# Patient Record
Sex: MALE | Race: BLACK OR AFRICAN AMERICAN | NOT HISPANIC OR LATINO | Employment: FULL TIME | URBAN - METROPOLITAN AREA
[De-identification: names, ages, dates, MRNs, and addresses within clinical notes are randomized per-mention and may not be internally consistent; named-entity substitution may affect disease eponyms.]

---

## 2020-04-08 ENCOUNTER — HISTORICAL (OUTPATIENT)
Dept: ADMINISTRATIVE | Facility: HOSPITAL | Age: 25
End: 2020-04-08

## 2022-04-11 ENCOUNTER — HISTORICAL (OUTPATIENT)
Dept: ADMINISTRATIVE | Facility: HOSPITAL | Age: 27
End: 2022-04-11

## 2022-04-28 VITALS — BODY MASS INDEX: 30.45 KG/M2 | WEIGHT: 182.75 LBS | HEIGHT: 65 IN

## 2022-07-20 ENCOUNTER — HOSPITAL ENCOUNTER (EMERGENCY)
Facility: HOSPITAL | Age: 27
Discharge: HOME | End: 2022-07-21
Attending: FAMILY MEDICINE

## 2022-07-20 VITALS
SYSTOLIC BLOOD PRESSURE: 159 MMHG | DIASTOLIC BLOOD PRESSURE: 63 MMHG | TEMPERATURE: 97 F | HEIGHT: 66 IN | BODY MASS INDEX: 30.97 KG/M2 | OXYGEN SATURATION: 100 % | WEIGHT: 192.69 LBS | HEART RATE: 60 BPM | RESPIRATION RATE: 20 BRPM

## 2022-07-20 DIAGNOSIS — M25.561 ACUTE PAIN OF RIGHT KNEE: Primary | ICD-10-CM

## 2022-07-20 DIAGNOSIS — M25.569 KNEE PAIN: ICD-10-CM

## 2022-07-20 DIAGNOSIS — T14.90XA BLUNT FORCE INJURY: ICD-10-CM

## 2022-07-20 PROCEDURE — 99284 EMERGENCY DEPT VISIT MOD MDM: CPT | Mod: 25

## 2022-07-20 NOTE — Clinical Note
"Suzette Welch" Saul was seen and treated in our emergency department on 7/20/2022.  He may return to work on 07/23/2022.       If you have any questions or concerns, please don't hesitate to call.       RN    "

## 2022-07-21 RX ORDER — INDOMETHACIN 50 MG/1
50 CAPSULE ORAL
Qty: 15 CAPSULE | Refills: 0 | OUTPATIENT
Start: 2022-07-21 | End: 2023-11-03

## 2022-07-21 RX ORDER — CYCLOBENZAPRINE HCL 10 MG
10 TABLET ORAL 3 TIMES DAILY PRN
Qty: 15 TABLET | Refills: 0 | Status: SHIPPED | OUTPATIENT
Start: 2022-07-21 | End: 2022-07-26

## 2022-07-21 NOTE — ED PROVIDER NOTES
Encounter Date: 7/20/2022       History     Chief Complaint   Patient presents with    Knee Pain     Right knee pain after [address] drive shaft fell onto person 2 days prior. No deformity observed     27 yo M presents to ED c/o R knee pain after dropping drive shaft on it yesterday at work. Reports that he attempted to go to work today, but pain was even worse. Pain located along medial portion of knee. ROM intact & patient able to ambulate w/o difficulty.        Review of patient's allergies indicates:  No Known Allergies  No past medical history on file.  No past surgical history on file.  No family history on file.     Review of Systems   Constitutional: Negative for chills and fever.   Respiratory: Negative for shortness of breath.    Cardiovascular: Negative for chest pain and leg swelling.   Gastrointestinal: Negative for abdominal pain, nausea and vomiting.   Musculoskeletal: Positive for arthralgias, joint swelling and myalgias. Negative for back pain and gait problem.   Skin: Negative for color change, pallor, rash and wound.   Neurological: Negative for numbness.   Hematological: Does not bruise/bleed easily.   All other systems reviewed and are negative.      Physical Exam     Initial Vitals [07/20/22 2258]   BP Pulse Resp Temp SpO2   (!) 159/63 60 20 97.2 °F (36.2 °C) 100 %      MAP       --         Physical Exam    Nursing note and vitals reviewed.  Constitutional: He appears well-developed and well-nourished. He is not diaphoretic. No distress.   HENT:   Head: Normocephalic and atraumatic.   Eyes: Conjunctivae are normal. Pupils are equal, round, and reactive to light.   Neck: Neck supple.   Normal range of motion.  Cardiovascular: Normal rate, regular rhythm, normal heart sounds and intact distal pulses. Exam reveals no gallop and no friction rub.    No murmur heard.  Pulmonary/Chest: Breath sounds normal.   Abdominal: Abdomen is soft. Bowel sounds are normal.   Musculoskeletal:         General: No  edema. Normal range of motion.      Cervical back: Normal range of motion and neck supple.      Right knee: No swelling, deformity, effusion, erythema, ecchymosis, lacerations, bony tenderness or crepitus. Normal range of motion. Tenderness present over the medial joint line. Normal alignment.     Neurological: He is alert and oriented to person, place, and time. No cranial nerve deficit or sensory deficit.   Skin: Skin is warm and dry. Capillary refill takes less than 2 seconds. No erythema. No pallor.   Psychiatric: He has a normal mood and affect.         ED Course   Procedures  Labs Reviewed - No data to display       Imaging Results          X-Ray Knee 3 View Right (Preliminary result)  Result time 07/21/22 00:18:21    ED Interpretation by MOLLY Davis (07/21/22 00:18:21, [facility], Emergency Medicine)    No acute fracture or dislocation.                               Medications - No data to display  Medical Decision Making:   Clinical Tests:   Radiological Study: Ordered and Reviewed  Physical exam unremarkable. No acute abnormality on XR. Will discharge w/ NSAID & muscle relaxer & encouraged RICE therapy.                      Clinical Impression:   Final diagnoses:  [M25.569] Knee pain  [T14.90XA] Blunt force injury  [M25.561] Acute pain of right knee (Primary)          ED Disposition Condition    Discharge Stable        ED Prescriptions     Medication Sig Dispense Start Date End Date Auth. Provider    indomethacin (INDOCIN) 50 MG capsule Take 1 capsule (50 mg total) by mouth 3 (three) times daily with meals. 15 capsule 7/21/2022  MOLLY Davis    cyclobenzaprine (FLEXERIL) 10 MG tablet Take 1 tablet (10 mg total) by mouth 3 (three) times daily as needed (muscle pain). 15 tablet 7/21/2022 7/26/2022 MOLLY Davis        Follow-up Information     Follow up With Specialties Details Why Contact Info Additional Information    [facility]  Internal Medicine In 4 weeks  [address] [address] [facility] [id number] [phone] Internal Medicine Clinic Entrance #1    [facility] Emergency Medicine  If symptoms worsen [address] [facility] [id number] [phone]            MOLLY Davis  07/21/22 0024

## 2022-12-22 ENCOUNTER — HOSPITAL ENCOUNTER (EMERGENCY)
Facility: HOSPITAL | Age: 27
Discharge: HOME | End: 2022-12-22
Attending: FAMILY MEDICINE
Payer: MEDICAID

## 2022-12-22 VITALS
WEIGHT: 189 LBS | RESPIRATION RATE: 20 BRPM | HEART RATE: 59 BPM | BODY MASS INDEX: 30.37 KG/M2 | SYSTOLIC BLOOD PRESSURE: 157 MMHG | HEIGHT: 66 IN | TEMPERATURE: 98 F | DIASTOLIC BLOOD PRESSURE: 91 MMHG | OXYGEN SATURATION: 97 %

## 2022-12-22 DIAGNOSIS — H66.92 LEFT OTITIS MEDIA, UNSPECIFIED OTITIS MEDIA TYPE: Primary | ICD-10-CM

## 2022-12-22 PROCEDURE — 99283 EMERGENCY DEPT VISIT LOW MDM: CPT

## 2022-12-22 PROCEDURE — 25000003 PHARM REV CODE 250: Performed by: NURSE PRACTITIONER

## 2022-12-22 RX ORDER — AMOXICILLIN AND CLAVULANATE POTASSIUM 875; 125 MG/1; MG/1
1 TABLET, FILM COATED ORAL
Status: COMPLETED | OUTPATIENT
Start: 2022-12-22 | End: 2022-12-22

## 2022-12-22 RX ORDER — AMOXICILLIN AND CLAVULANATE POTASSIUM 875; 125 MG/1; MG/1
1 TABLET, FILM COATED ORAL 2 TIMES DAILY
Qty: 14 TABLET | Refills: 0 | OUTPATIENT
Start: 2022-12-22 | End: 2023-11-03

## 2022-12-22 RX ADMIN — AMOXICILLIN AND CLAVULANATE POTASSIUM 1 TABLET: 875; 125 TABLET, FILM COATED ORAL at 08:12

## 2022-12-22 NOTE — Clinical Note
"Suzette Escalanteartie" Saul was seen and treated in our emergency department on 12/22/2022.  He may return to work on 12/24/2022.       If you have any questions or concerns, please don't hesitate to call.      DAFNE Ruiz"

## 2022-12-23 NOTE — DISCHARGE INSTRUCTIONS
Claritin, Zyrtec or Allegra over the counter  Augmentin twice a day  Tylenol or Motrin as needed for pain

## 2022-12-23 NOTE — ED PROVIDER NOTES
Encounter Date: 12/22/2022       History     Chief Complaint   Patient presents with    Otalgia     C/o L ear pain and decreased hearing that began a few days ago, tried to clean with hydrogen peroxide with no improvement.     27-year-old male presents to ER complaining of left ear pain.  States it started several days ago when he felt sweat rolled down in his ear and he took his finger trying to clear his ear.  He denies any bloody drainage.  He reports no fever, sore throat, cough or congestion.    The history is provided by the patient.   Review of patient's allergies indicates:  No Known Allergies  No past medical history on file.  No past surgical history on file.  No family history on file.     Review of Systems   Constitutional:  Negative for chills and fever.   HENT:  Positive for ear pain. Negative for ear discharge, postnasal drip, rhinorrhea and sore throat.    Respiratory:  Negative for cough.    Gastrointestinal:  Negative for vomiting.   Neurological:  Negative for dizziness and light-headedness.   All other systems reviewed and are negative.    Physical Exam     Initial Vitals [12/22/22 1947]   BP Pulse Resp Temp SpO2   (!) 157/91 (!) 59 20 98.1 °F (36.7 °C) 97 %      MAP       --         Physical Exam    Constitutional: He appears well-developed and well-nourished.   HENT:   Head: Normocephalic.   Left Ear: No drainage or swelling. Tympanic membrane is erythematous and retracted.   Eyes: EOM are normal.   Neck: Neck supple.   Normal range of motion.  Cardiovascular:  Normal rate and regular rhythm.           Pulmonary/Chest: No respiratory distress.   Abdominal: He exhibits no distension.   Musculoskeletal:         General: Normal range of motion.      Cervical back: Normal range of motion and neck supple.     Neurological: He is alert and oriented to person, place, and time.   Skin: Skin is warm. Capillary refill takes less than 2 seconds.   Psychiatric: He has a normal mood and affect.       ED  Course   Procedures  Labs Reviewed - No data to display       Imaging Results    None          Medications   amoxicillin-clavulanate 875-125mg per tablet 1 tablet (has no administration in time range)     Medical Decision Making:   Differential Diagnosis:   OM, OE, URI, Perforated TM  ED Management:  Patient with erythematous, retracted TM, No perforation or bloody discharged. Augmentin started in ER and home with Rx.                         Clinical Impression:   Final diagnoses:  [H66.92] Left otitis media, unspecified otitis media type (Primary)        ED Disposition Condition    Discharge Stable          ED Prescriptions       Medication Sig Dispense Start Date End Date Auth. Provider    amoxicillin-clavulanate 875-125mg (AUGMENTIN) 875-125 mg per tablet Take 1 tablet by mouth 2 (two) times daily. 14 tablet 12/22/2022 -- DAFNE Ruiz          Follow-up Information    None          DAFNE Ruiz  12/22/22 1957

## 2023-08-05 ENCOUNTER — HOSPITAL ENCOUNTER (EMERGENCY)
Facility: HOSPITAL | Age: 28
Discharge: HOME | End: 2023-08-05
Attending: STUDENT IN AN ORGANIZED HEALTH CARE EDUCATION/TRAINING PROGRAM
Payer: MEDICAID

## 2023-08-05 VITALS
HEART RATE: 62 BPM | RESPIRATION RATE: 18 BRPM | BODY MASS INDEX: 30.67 KG/M2 | SYSTOLIC BLOOD PRESSURE: 140 MMHG | DIASTOLIC BLOOD PRESSURE: 86 MMHG | WEIGHT: 190 LBS | TEMPERATURE: 98 F | OXYGEN SATURATION: 100 %

## 2023-08-05 DIAGNOSIS — S00.03XA CONTUSION OF SCALP, INITIAL ENCOUNTER: ICD-10-CM
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
## 2023-08-05 DIAGNOSIS — S01.81XA LACERATION OF SKIN OF FACE, INITIAL ENCOUNTER: ICD-10-CM

## 2023-08-05 PROCEDURE — 63600175 PHARM REV CODE 636 W HCPCS: Performed by: STUDENT IN AN ORGANIZED HEALTH CARE EDUCATION/TRAINING PROGRAM

## 2023-08-05 PROCEDURE — 90471 IMMUNIZATION ADMIN: CPT | Performed by: STUDENT IN AN ORGANIZED HEALTH CARE EDUCATION/TRAINING PROGRAM

## 2023-08-05 PROCEDURE — 99285 EMERGENCY DEPT VISIT HI MDM: CPT | Mod: 25

## 2023-08-05 PROCEDURE — 90715 TDAP VACCINE 7 YRS/> IM: CPT | Performed by: STUDENT IN AN ORGANIZED HEALTH CARE EDUCATION/TRAINING PROGRAM

## 2023-08-05 PROCEDURE — 12011 RPR F/E/E/N/L/M 2.5 CM/<: CPT

## 2023-08-05 RX ORDER — HYDROXYZINE HYDROCHLORIDE 25 MG/1
25 TABLET, FILM COATED ORAL 3 TIMES DAILY PRN
Qty: 10 TABLET | Refills: 0 | Status: SHIPPED | OUTPATIENT
Start: 2023-08-05

## 2023-08-05 RX ADMIN — TETANUS TOXOID, REDUCED DIPHTHERIA TOXOID AND ACELLULAR PERTUSSIS VACCINE, ADSORBED 0.5 ML: 5; 2.5; 8; 8; 2.5 SUSPENSION INTRAMUSCULAR at 08:08

## 2023-08-06 NOTE — ED NOTES
Pt w small hematoma and  superficial puncture wound to lt  lateral brow area= last td unknown, free of bleeding- cleaned w wound cleanser here- bandaid/ice pack applied- denies loc.
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
## 2023-11-03 ENCOUNTER — HOSPITAL ENCOUNTER (EMERGENCY)
Facility: HOSPITAL | Age: 28
Discharge: HOME | End: 2023-11-03
Attending: EMERGENCY MEDICINE
Payer: MEDICAID

## 2023-11-03 VITALS
HEIGHT: 70 IN | BODY MASS INDEX: 28.2 KG/M2 | WEIGHT: 197 LBS | TEMPERATURE: 98 F | DIASTOLIC BLOOD PRESSURE: 85 MMHG | SYSTOLIC BLOOD PRESSURE: 120 MMHG | RESPIRATION RATE: 15 BRPM | OXYGEN SATURATION: 100 % | HEART RATE: 51 BPM

## 2023-11-03 DIAGNOSIS — G89.29 CHRONIC DENTAL PAIN: ICD-10-CM

## 2023-11-03 DIAGNOSIS — K08.9 CHRONIC DENTAL PAIN: ICD-10-CM

## 2023-11-03 DIAGNOSIS — R68.84 PAIN IN LOWER JAW: Primary | ICD-10-CM

## 2023-11-03 LAB
ALBUMIN SERPL-MCNC: 3.9 G/DL (ref 3.5–5)
ALBUMIN/GLOB SERPL: 1.3 RATIO (ref 1.1–2)
ALP SERPL-CCNC: 73 UNIT/L (ref 40–150)
ALT SERPL-CCNC: 30 UNIT/L (ref 0–55)
AST SERPL-CCNC: 18 UNIT/L (ref 5–34)
BASOPHILS # BLD AUTO: 0.02 X10(3)/MCL
BASOPHILS NFR BLD AUTO: 0.2 %
BILIRUB SERPL-MCNC: 0.3 MG/DL
BUN SERPL-MCNC: 15.6 MG/DL (ref 8.9–20.6)
CALCIUM SERPL-MCNC: 9.4 MG/DL (ref 8.4–10.2)
CHLORIDE SERPL-SCNC: 104 MMOL/L (ref 98–107)
CO2 SERPL-SCNC: 27 MMOL/L (ref 22–29)
CREAT SERPL-MCNC: 1.17 MG/DL (ref 0.73–1.18)
EOSINOPHIL # BLD AUTO: 0.08 X10(3)/MCL (ref 0–0.9)
EOSINOPHIL NFR BLD AUTO: 0.7 %
ERYTHROCYTE [DISTWIDTH] IN BLOOD BY AUTOMATED COUNT: 14.2 % (ref 11.5–17)
ETHANOL SERPL-MCNC: <10 MG/DL
GFR SERPLBLD CREATININE-BSD FMLA CKD-EPI: >60 MLS/MIN/1.73/M2
GLOBULIN SER-MCNC: 2.9 GM/DL (ref 2.4–3.5)
GLUCOSE SERPL-MCNC: 108 MG/DL (ref 74–100)
HCT VFR BLD AUTO: 41.7 % (ref 42–52)
HGB BLD-MCNC: 14.1 G/DL (ref 14–18)
IMM GRANULOCYTES # BLD AUTO: 0.04 X10(3)/MCL (ref 0–0.04)
IMM GRANULOCYTES NFR BLD AUTO: 0.3 %
LYMPHOCYTES # BLD AUTO: 3.73 X10(3)/MCL (ref 0.6–4.6)
LYMPHOCYTES NFR BLD AUTO: 32 %
MCH RBC QN AUTO: 30.1 PG (ref 27–31)
MCHC RBC AUTO-ENTMCNC: 33.8 G/DL (ref 33–36)
MCV RBC AUTO: 88.9 FL (ref 80–94)
MONOCYTES # BLD AUTO: 0.85 X10(3)/MCL (ref 0.1–1.3)
MONOCYTES NFR BLD AUTO: 7.3 %
NEUTROPHILS # BLD AUTO: 6.92 X10(3)/MCL (ref 2.1–9.2)
NEUTROPHILS NFR BLD AUTO: 59.5 %
NRBC BLD AUTO-RTO: 0 %
PLATELET # BLD AUTO: 341 X10(3)/MCL (ref 130–400)
PMV BLD AUTO: 11.4 FL (ref 7.4–10.4)
POTASSIUM SERPL-SCNC: 4.3 MMOL/L (ref 3.5–5.1)
PROT SERPL-MCNC: 6.8 GM/DL (ref 6.4–8.3)
RBC # BLD AUTO: 4.69 X10(6)/MCL (ref 4.7–6.1)
SODIUM SERPL-SCNC: 138 MMOL/L (ref 136–145)
WBC # SPEC AUTO: 11.64 X10(3)/MCL (ref 4.5–11.5)

## 2023-11-03 PROCEDURE — 25500020 PHARM REV CODE 255: Performed by: EMERGENCY MEDICINE

## 2023-11-03 PROCEDURE — 96375 TX/PRO/DX INJ NEW DRUG ADDON: CPT

## 2023-11-03 PROCEDURE — 25000003 PHARM REV CODE 250: Performed by: EMERGENCY MEDICINE

## 2023-11-03 PROCEDURE — 96374 THER/PROPH/DIAG INJ IV PUSH: CPT

## 2023-11-03 PROCEDURE — 82077 ASSAY SPEC XCP UR&BREATH IA: CPT | Performed by: EMERGENCY MEDICINE

## 2023-11-03 PROCEDURE — 99285 EMERGENCY DEPT VISIT HI MDM: CPT | Mod: 25

## 2023-11-03 PROCEDURE — 80053 COMPREHEN METABOLIC PANEL: CPT | Performed by: EMERGENCY MEDICINE

## 2023-11-03 PROCEDURE — 63600175 PHARM REV CODE 636 W HCPCS: Performed by: EMERGENCY MEDICINE

## 2023-11-03 PROCEDURE — 85025 COMPLETE CBC W/AUTO DIFF WBC: CPT | Performed by: EMERGENCY MEDICINE

## 2023-11-03 PROCEDURE — 96361 HYDRATE IV INFUSION ADD-ON: CPT

## 2023-11-03 RX ORDER — AMOXICILLIN AND CLAVULANATE POTASSIUM 875; 125 MG/1; MG/1
1 TABLET, FILM COATED ORAL 2 TIMES DAILY
Qty: 14 TABLET | Refills: 0 | Status: SHIPPED | OUTPATIENT
Start: 2023-11-03

## 2023-11-03 RX ORDER — PROCHLORPERAZINE EDISYLATE 5 MG/ML
10 INJECTION INTRAMUSCULAR; INTRAVENOUS
Status: COMPLETED | OUTPATIENT
Start: 2023-11-03 | End: 2023-11-03

## 2023-11-03 RX ORDER — KETOROLAC TROMETHAMINE 10 MG/1
10 TABLET, FILM COATED ORAL EVERY 6 HOURS
Qty: 20 TABLET | Refills: 0 | Status: SHIPPED | OUTPATIENT
Start: 2023-11-03 | End: 2023-11-08

## 2023-11-03 RX ORDER — KETOROLAC TROMETHAMINE 30 MG/ML
30 INJECTION, SOLUTION INTRAMUSCULAR; INTRAVENOUS
Status: COMPLETED | OUTPATIENT
Start: 2023-11-03 | End: 2023-11-03

## 2023-11-03 RX ADMIN — IOPAMIDOL 100 ML: 755 INJECTION, SOLUTION INTRAVENOUS at 01:11

## 2023-11-03 RX ADMIN — PROCHLORPERAZINE EDISYLATE 10 MG: 5 INJECTION INTRAMUSCULAR; INTRAVENOUS at 03:11

## 2023-11-03 RX ADMIN — KETOROLAC TROMETHAMINE 30 MG: 30 INJECTION, SOLUTION INTRAMUSCULAR; INTRAVENOUS at 03:11

## 2023-11-03 RX ADMIN — SODIUM CHLORIDE 1000 ML: 9 INJECTION, SOLUTION INTRAVENOUS at 03:11

## 2023-11-03 NOTE — ED PROVIDER NOTES
Encounter Date: 11/3/2023       History     Chief Complaint   Patient presents with    Jaw Pain     L. Jaw pain/swelling that pt initially stated began 15 min ago, but then stated it has been ongoing x2 years since he had hardware place after a fracture, also c/o subjective fever, pt has localized swelling to l. Side of face, denies any dental problems/injuries. Pt is very drowsy, states he took advil PM PTA.     27-year-old male with a history of jaw fracture and ORIF/plating presents to the emergency department for evaluation of atraumatic left jaw pain radiating to the ears bilaterally which worsened this evening.  Reports chronic pain for the last several years; however, states when the weather changes, particularly when it gets cold, it worsens.  He reports subjective fever as well.  He did take Advil p.m. earlier this evening without relief.  States that he missed his follow-up at [facility].     The history is provided by the patient and medical records.   Dental Pain  The primary symptoms include fever (subjective). Primary symptoms do not include shortness of breath or sore throat.   Additional symptoms do not include: trouble swallowing.     Review of patient's allergies indicates:  No Known Allergies  No past medical history on file.  No past surgical history on file.  No family history on file.     Review of Systems   Constitutional:  Positive for fever (subjective).   HENT:  Positive for dental problem. Negative for congestion, sore throat and trouble swallowing.    Respiratory:  Negative for chest tightness and shortness of breath.    Gastrointestinal:  Negative for nausea and vomiting.       Physical Exam     Initial Vitals [11/03/23 0114]   BP Pulse Resp Temp SpO2   (!) 142/103 63 15 98.4 °F (36.9 °C) 100 %      MAP       --         Physical Exam    Nursing note and vitals reviewed.  Constitutional: He appears well-developed and well-nourished. No distress.   HENT:   Head: Normocephalic and atraumatic.    Tenderness to midline mandible and bilateral lower gingiva, no fluctuance or drainage, no visible swelling   Eyes: Conjunctivae are normal. No scleral icterus.   Neck: Neck supple.   No trismus   Normal range of motion.  Pulmonary/Chest: No respiratory distress.   Musculoskeletal:      Cervical back: Normal range of motion and neck supple.     Neurological: He is alert and oriented to person, place, and time. GCS score is 15. GCS eye subscore is 4. GCS verbal subscore is 5. GCS motor subscore is 6.         ED Course   Procedures  Labs Reviewed   COMPREHENSIVE METABOLIC PANEL - Abnormal; Notable for the following components:       Result Value    Glucose Level 108 (*)     All other components within normal limits   CBC WITH DIFFERENTIAL - Abnormal; Notable for the following components:    WBC 11.64 (*)     RBC 4.69 (*)     Hct 41.7 (*)     MPV 11.4 (*)     All other components within normal limits   ALCOHOL,MEDICAL (ETHANOL) - Normal   CBC W/ AUTO DIFFERENTIAL    Narrative:     The following orders were created for panel order CBC auto differential.  Procedure                               Abnormality         Status                     ---------                               -----------         ------                     CBC with Differential[[id number]]        Abnormal            Final result                 Please view results for these tests on the individual orders.          Imaging Results              CT Soft Tissue Neck With Contrast (Preliminary result)  Result time 11/03/23 01:56:18      Preliminary result by Aj Mccloud Jr., MD (11/03/23 01:56:18)                   Narrative:    START OF REPORT:  Technique: CT of the soft tissues of the neck was performed with intravenous contrast with direct axial as well as sagittal and coronal reformations.    Comparison: Comparison is with study dated 2020-02-24 12:25:41.    Clinical history: Swelling, tenderness adjacent to jaw hardware, eval for  infection.    Findings: Postoperative changes are again seen in the mandible, along the midline. No radiologically-evident hardware complication. No discrete fluid collection or abscess is identified in the surrounding soft tissues.  Sinuses: The visualized paranasal sinuses are clear.  Nasopharynx: Unremarkable.  Oropharynx: Unremarkable.  Larynx: Unremarkable.  Trachea: Unremarkable.  Esophagus: Unremarkable.  Vascular structures: Unremarkable.  Thyroid glands: Unremarkable. Bilateral parotid and submandibularglands appear unremarkable. No evidence of duct dilatation or sialolith.  Lymph nodes: No cervical lymphadenopathy is seen as per CT size criteria.    Bony structures: The cervical spine appears unremarkable.      Impression:  1. Postoperative changes are again seen in the mandible, along the midline. No radiologically-evident hardware complication. No discrete fluid collection or abscess is identified in the surrounding soft tissues.  2. Details and other findings as described above.                                         Medications   iopamidoL (ISOVUE-370) injection 100 mL (100 mLs Intravenous Given 11/3/23 0158)   ketorolac injection 30 mg (30 mg Intravenous Given 11/3/23 0330)   prochlorperazine injection Soln 10 mg (10 mg Intravenous Given 11/3/23 0330)   sodium chloride 0.9% bolus 1,000 mL 1,000 mL (1,000 mLs Intravenous New Bag 11/3/23 0330)     Medical Decision Making  Problems Addressed:  Chronic dental pain: chronic illness or injury with exacerbation, progression, or side effects of treatment  Pain in lower jaw: acute illness or injury    Amount and/or Complexity of Data Reviewed  Labs: ordered.  Radiology: ordered.    Risk  Prescription drug management.      ED assessment:    Mr. Hughes presented for evaluation of jaw pain - states chronic pain but worsens with temperature change (currently cold outside). Reported subjective fever as well but afebrile here.     Differential diagnosis (including  but not limited to):   Dental pain, prosthesis pain, gingiviits, dental abscess, hardware infection    ED management:   Labs unremarkable and CT scan w/o significant inflammatory changes or drainable fluid collection. Given reported subjective fever, tenderness and underlying hardware, will give course of oral abx for potential early dental infection. Pain improved w/ NSAIDs in ED - will DC w/ same to help with ongoing pain control at home. Counseled to follow up with primary care - contact information for the clinic provided to patient to assist in establishing follow up. ED return precautions reviewed at the bedside and provided in the written discharge instructions. All questions answered to the best of my ability.       My independent radiology interpretation:   CT soft tissue neck: mental hardware noted, no fluid collection or significant stranding      Amount and/or Complexity of Data Reviewed  Independent historian: none   Summary of history:   External data reviewed: notes from previous ED visits and prior imaging  Summary of data reviewed: previous ED visits w/ various pain complaints, missed FP appointment in 2022 - no scheduled outpatient follow up since that time.   Risk and benefits of testing: discussed   Labs: ordered and reviewed  Radiology: ordered and independent interpretation performed (see above or ED course)      Risk  Prescription drug management   Shared decision making     Critical Care  none    I, Denisha Massey MD personally performed the history, PE, MDM, and procedures as documented above and agree with the scribe's documentation.                              Clinical Impression:   Final diagnoses:  [R68.84] Pain in lower jaw (Primary)  [K08.9, G89.29] Chronic dental pain        ED Disposition Condition    Discharge Stable          ED Prescriptions       Medication Sig Dispense Start Date End Date Auth. Provider    ketorolac (TORADOL) 10 mg tablet Take 1 tablet (10 mg total) by mouth  every 6 (six) hours. for 5 days 20 tablet 11/3/2023 11/8/2023 Denisha Massey MD    amoxicillin-clavulanate 875-125mg (AUGMENTIN) 875-125 mg per tablet Take 1 tablet by mouth 2 (two) times daily. 14 tablet 11/3/2023 -- Denisha Massey MD          Follow-up Information       Follow up With Specialties Details Why Contact Info    Your primary care physician  Schedule an appointment as soon as possible for a visit   Call your primary care physician or you can call [phone] to schedule with a primary care physician    [facility] - Emergency Dept Emergency Medicine  If symptoms worsen [address] [address] [phone]    Alicia Nagy FNP Nurse Practitioner  This is the Family Practice Clinic that you missed her appointment with last year.  Please contact the clinic to schedule follow up appointment. [address] [facility]  Internal Medicine Clinic  [facility] [address] [phone]               Denisha Massey MD  11/04/23 9222

## 2024-02-27 NOTE — DISCHARGE INSTRUCTIONS
Report to Emergency Department if symptoms return or worsen; [facility] - Medicine Clinic Within 1 to 2 days, It is important that you follow up with your primary care provider or specialist if indicated for further evaluation, workup, and treatment as necessary. The exam and treatment you received in Emergency Department was for an urgent problem and NOT INTENDED AS COMPLETE CARE. It is important that you FOLLOW UP with a doctor for ongoing care. If your symptoms become WORSE or you DO NOT IMPROVE and you are unable to reach your health care provider, you should RETURN to the Emergency Department. The Emergency Department provider has provided a PRELIMINARY INTERPRETATION of all your studies. A final interpretation may be done after you are discharged. If a change in your diagnosis or treatment is needed WE WILL CONTACT YOU. It is critical that we have a CURRENT PHONE NUMBER FOR YOU.      none

## 2024-05-09 ENCOUNTER — OFFICE VISIT (OUTPATIENT)
Dept: URGENT CARE | Facility: CLINIC | Age: 29
End: 2024-05-09
Payer: COMMERCIAL

## 2024-05-09 VITALS
OXYGEN SATURATION: 99 % | WEIGHT: 214 LBS | DIASTOLIC BLOOD PRESSURE: 88 MMHG | TEMPERATURE: 98.2 F | SYSTOLIC BLOOD PRESSURE: 122 MMHG | HEART RATE: 59 BPM | RESPIRATION RATE: 20 BRPM

## 2024-05-09 DIAGNOSIS — G47.00 INSOMNIA, UNSPECIFIED TYPE: Primary | ICD-10-CM

## 2024-05-09 PROCEDURE — 99202 OFFICE O/P NEW SF 15 MIN: CPT | Performed by: NURSE PRACTITIONER

## 2024-05-09 RX ORDER — ALPRAZOLAM 1 MG/1
1 TABLET ORAL
COMMUNITY
Start: 2024-04-19

## 2024-05-09 RX ORDER — OXYCODONE AND ACETAMINOPHEN 5; 325 MG/1; MG/1
1 TABLET ORAL EVERY 4 HOURS PRN
COMMUNITY

## 2024-05-09 ASSESSMENT — ENCOUNTER SYMPTOMS
VOMITING: 0
SHORTNESS OF BREATH: 0
CHILLS: 0
NECK STIFFNESS: 0
CHOKING: 0
PALPITATIONS: 0
CHEST TIGHTNESS: 0
COUGH: 0
FEVER: 0
FATIGUE: 0
APPETITE CHANGE: 0
DIARRHEA: 0
EYES NEGATIVE: 1
NECK PAIN: 0
NAUSEA: 0

## 2024-05-09 ASSESSMENT — PAIN SCALES - GENERAL: PAINLEVEL: 9

## 2024-05-09 NOTE — PROGRESS NOTES
Subjective      Aide Hartley is a 28 y.o. male who presents for medication refill.  He states he is needing his Xanax refill and he has not also like a Percocet refill.  He states he takes Xanax 1 mg at bedtime to help him sleep.  Review of his chart and noted that in care everywhere and review of [facility] that he was in [facility] 4/29/2024.  He was complaining of anxiety at that time.  He was having difficulty with sleeping.  He was provided Lunesta 1 mg tablets, 30  tabs and he was provided Xanax 1 mg tabs,  20 tablets.  He still has Lunesta on hand.  He has tried hydroxyzine in the past and noted that he was groggy for the morning of the day after taking it.  He does have a primary care provider prescriber in [place].  On his visit 4/29/2024 he stated that he would be going home in 1 month.  He advised today that his provider in [place] could not prescribe across state lines.  Patient states he is working here in [place].  He did visit with primary care provider Terrance Ribeiro 4/19/2024 to establish care.  This was in [facility].  HPI    The following have been reviewed and updated as appropriate in this visit:          No Known Allergies  Current Outpatient Medications   Medication Sig Dispense Refill    ALPRAZolam (XANAX) 1 mg tablet Take 1 tablet (1 mg total) by mouth      oxyCODONE-acetaminophen (PERCOCET) 5-325 mg per tablet Take 1 tablet by mouth every 4 (four) hours as needed for pain scale 8-10/10 Max Daily Amount: 6 tablets       No current facility-administered medications for this visit.     No past medical history on file.  No past surgical history on file.  No family history on file.  Social History     Socioeconomic History    Marital status: Single       Review of Systems   Constitutional:  Negative for appetite change, chills, fatigue and fever.   Eyes: Negative.    Respiratory:  Negative for cough, choking, chest tightness and shortness of breath.    Cardiovascular:   Negative for chest pain and palpitations.   Gastrointestinal:  Negative for diarrhea, nausea and vomiting.   Musculoskeletal:  Negative for neck pain and neck stiffness.       Objective   /88 (BP Location: Right arm, Patient Position: Sitting, Cuff Size: Regular Adult) Comment: manual  Pulse 59   Temp 36.8 °C (98.2 °F) (Temporal)   Resp 20   Wt 97.1 kg (214 lb)   SpO2 99%     Physical Exam  Constitutional:       General: He is not in acute distress.     Appearance: Normal appearance. He is not ill-appearing or toxic-appearing.   Eyes:      Conjunctiva/sclera: Conjunctivae normal.   Cardiovascular:      Rate and Rhythm: Normal rate and regular rhythm.      Heart sounds: Normal heart sounds. No murmur heard.  Pulmonary:      Effort: Pulmonary effort is normal. No respiratory distress.      Breath sounds: Normal breath sounds.   Musculoskeletal:      Cervical back: Normal range of motion and neck supple. No rigidity or tenderness.   Lymphadenopathy:      Cervical: No cervical adenopathy.   Skin:     General: Skin is warm and dry.   Neurological:      General: No focal deficit present.      Mental Status: He is alert.   Psychiatric:         Mood and Affect: Mood normal.         Behavior: Behavior normal.         No results found for this or any previous visit (from the past 24 hour(s)).    Assessment/Plan   Diagnoses and all orders for this visit:    Insomnia, unspecified type    Patient not in acute distress.  His vital signs are stable.  Heart rate 59.  Blood pressure 122/88.  He does not appear anxious today.  Offered and he declined prescription for hydroxyzine to use as needed anxiety.  Also, advised that he make an appointment today to establish with a primary care provider here in [place] as he will be staying through the end of the year.  Advised that if there is not one readily available in [place] that he could try the [facility] to try and establish there.  Also  advised that he sign a release of information for the provider that he is scheduled with so that they may have a heads up regarding his needs and healthcare history.  Alternatively he could travel back to [place] for his medication request from his health care provider there.  Noted in his chart that he did have primary care visit 4/19/2024 to establish care with [facility].  Noted that he does have Lunesta on hand at present.  Patient acknowledged understanding and acceptance for the care plan.    There are no Patient Instructions on file for this visit.    Letty Mitchell, CNP

## 2024-06-14 ENCOUNTER — HOSPITAL ENCOUNTER (EMERGENCY)
Facility: HOSPITAL | Age: 29
Discharge: HOME | End: 2024-06-14
Attending: EMERGENCY MEDICINE
Payer: COMMERCIAL

## 2024-06-14 VITALS
OXYGEN SATURATION: 99 % | HEART RATE: 72 BPM | RESPIRATION RATE: 20 BRPM | DIASTOLIC BLOOD PRESSURE: 107 MMHG | SYSTOLIC BLOOD PRESSURE: 164 MMHG | TEMPERATURE: 98 F

## 2024-06-14 DIAGNOSIS — R07.89 CHEST WALL TENDERNESS: ICD-10-CM
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
## 2024-06-14 DIAGNOSIS — M54.2 NECK PAIN: ICD-10-CM

## 2024-06-14 DIAGNOSIS — S20.212A CONTUSION OF LEFT CHEST WALL, INITIAL ENCOUNTER: ICD-10-CM

## 2024-06-14 DIAGNOSIS — M25.569 KNEE PAIN: ICD-10-CM

## 2024-06-14 DIAGNOSIS — I10 HYPERTENSION, UNSPECIFIED TYPE: Primary | ICD-10-CM

## 2024-06-14 LAB
OHS QRS DURATION: 100 MS
OHS QTC CALCULATION: 427 MS

## 2024-06-14 PROCEDURE — 93005 ELECTROCARDIOGRAM TRACING: CPT

## 2024-06-14 PROCEDURE — 25000003 PHARM REV CODE 250: Performed by: NURSE PRACTITIONER

## 2024-06-14 PROCEDURE — 99284 EMERGENCY DEPT VISIT MOD MDM: CPT | Mod: 25

## 2024-06-14 PROCEDURE — 93010 ELECTROCARDIOGRAM REPORT: CPT | Mod: ,,, | Performed by: INTERNAL MEDICINE

## 2024-06-14 RX ORDER — BUTALBITAL, ACETAMINOPHEN AND CAFFEINE 50; 325; 40 MG/1; MG/1; MG/1
1 TABLET ORAL EVERY 6 HOURS PRN
Qty: 15 TABLET | Refills: 0 | Status: SHIPPED | OUTPATIENT
Start: 2024-06-14

## 2024-06-14 RX ORDER — AMLODIPINE BESYLATE 10 MG/1
10 TABLET ORAL DAILY
Qty: 30 TABLET | Refills: 0 | Status: SHIPPED | OUTPATIENT
Start: 2024-06-14 | End: 2024-07-14

## 2024-06-14 RX ORDER — AMLODIPINE BESYLATE 5 MG/1
10 TABLET ORAL
Status: COMPLETED | OUTPATIENT
Start: 2024-06-14 | End: 2024-06-14

## 2024-06-14 RX ORDER — IBUPROFEN 600 MG/1
600 TABLET ORAL
Status: COMPLETED | OUTPATIENT
Start: 2024-06-14 | End: 2024-06-14

## 2024-06-14 RX ADMIN — AMLODIPINE BESYLATE 10 MG: 5 TABLET ORAL at 08:06

## 2024-06-14 RX ADMIN — IBUPROFEN 600 MG: 600 TABLET, FILM COATED ORAL at 08:06
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
## 2024-10-18 ENCOUNTER — HOSPITAL ENCOUNTER (EMERGENCY)
Facility: HOSPITAL | Age: 29
Discharge: LEFT BEFORE DISCHARGE | End: 2024-10-19
Payer: MEDICAID

## 2024-10-18 VITALS
WEIGHT: 197 LBS | OXYGEN SATURATION: 98 % | HEIGHT: 70 IN | RESPIRATION RATE: 16 BRPM | BODY MASS INDEX: 28.2 KG/M2 | HEART RATE: 90 BPM | DIASTOLIC BLOOD PRESSURE: 103 MMHG | SYSTOLIC BLOOD PRESSURE: 176 MMHG | TEMPERATURE: 98 F

## 2024-10-18 DIAGNOSIS — M25.571 RIGHT ANKLE PAIN: ICD-10-CM

## 2024-10-18 DIAGNOSIS — S99.921A RIGHT FOOT INJURY, INITIAL ENCOUNTER: ICD-10-CM

## 2024-10-18 PROCEDURE — 99900041 HC LEFT WITHOUT BEING SEEN- EMERGENCY
